# Patient Record
Sex: MALE | Race: WHITE | NOT HISPANIC OR LATINO | Employment: OTHER | URBAN - METROPOLITAN AREA
[De-identification: names, ages, dates, MRNs, and addresses within clinical notes are randomized per-mention and may not be internally consistent; named-entity substitution may affect disease eponyms.]

---

## 2023-05-30 ENCOUNTER — TRANSFERRED RECORDS (OUTPATIENT)
Dept: HEALTH INFORMATION MANAGEMENT | Facility: CLINIC | Age: 43
End: 2023-05-30

## 2023-07-20 ENCOUNTER — TRANSCRIBE ORDERS (OUTPATIENT)
Dept: OTHER | Age: 43
End: 2023-07-20

## 2023-07-28 ENCOUNTER — TELEPHONE (OUTPATIENT)
Dept: RADIOLOGY | Facility: CLINIC | Age: 43
End: 2023-07-28

## 2023-07-28 DIAGNOSIS — E26.9 HYPERALDOSTERONISM (H): Primary | ICD-10-CM

## 2023-07-28 NOTE — TELEPHONE ENCOUNTER
I called about an AVS referral from [facility] End[facility] Dr Beasley.  Ramiro has had CT done last November at the [facility], he states he is not on spironolactone.  His potassium level is slightly low but he is not on an oral potassium replacement.  [facility] number is [phone].  [facility] to get CT pushed, and lab report, see if there was a confirmatory testing completed.   I spoke with Ramiro and feliberto.   MARVA Louise, RN, BSN  Interventional Radiology Nurse Coordinator   Phone:  [phone]

## 2023-08-29 ENCOUNTER — ANCILLARY PROCEDURE (OUTPATIENT)
Dept: CT IMAGING | Facility: CLINIC | Age: 43
End: 2023-08-29
Attending: RADIOLOGY
Payer: COMMERCIAL

## 2023-08-29 ENCOUNTER — LAB (OUTPATIENT)
Dept: LAB | Facility: CLINIC | Age: 43
End: 2023-08-29
Attending: RADIOLOGY
Payer: COMMERCIAL

## 2023-08-29 DIAGNOSIS — E26.9 HYPERALDOSTERONISM (H): ICD-10-CM

## 2023-08-29 LAB
ANION GAP SERPL CALCULATED.3IONS-SCNC: 10 MMOL/L (ref 7–15)
BUN SERPL-MCNC: 13.3 MG/DL (ref 6–20)
CALCIUM SERPL-MCNC: 9.7 MG/DL (ref 8.6–10)
CHLORIDE SERPL-SCNC: 100 MMOL/L (ref 98–107)
CREAT SERPL-MCNC: 1.19 MG/DL (ref 0.67–1.17)
DEPRECATED HCO3 PLAS-SCNC: 29 MMOL/L (ref 22–29)
GFR SERPL CREATININE-BSD FRML MDRD: 78 ML/MIN/1.73M2
GLUCOSE SERPL-MCNC: 67 MG/DL (ref 70–99)
POTASSIUM SERPL-SCNC: 3.5 MMOL/L (ref 3.4–5.3)
SODIUM SERPL-SCNC: 139 MMOL/L (ref 136–145)

## 2023-08-29 PROCEDURE — 80048 BASIC METABOLIC PNL TOTAL CA: CPT | Performed by: PATHOLOGY

## 2023-08-29 PROCEDURE — 36415 COLL VENOUS BLD VENIPUNCTURE: CPT | Performed by: PATHOLOGY

## 2023-08-29 PROCEDURE — 74177 CT ABD & PELVIS W/CONTRAST: CPT | Performed by: RADIOLOGY

## 2023-08-29 RX ORDER — IOPAMIDOL 755 MG/ML
122 INJECTION, SOLUTION INTRAVASCULAR ONCE
Status: COMPLETED | OUTPATIENT
Start: 2023-08-29 | End: 2023-08-29

## 2023-08-29 RX ADMIN — IOPAMIDOL 122 ML: 755 INJECTION, SOLUTION INTRAVASCULAR at 18:11

## 2023-08-30 ENCOUNTER — ONCOLOGY VISIT (OUTPATIENT)
Dept: RADIOLOGY | Facility: CLINIC | Age: 43
End: 2023-08-30
Attending: RADIOLOGY
Payer: COMMERCIAL

## 2023-08-30 VITALS
WEIGHT: 293.2 LBS | HEART RATE: 90 BPM | DIASTOLIC BLOOD PRESSURE: 84 MMHG | SYSTOLIC BLOOD PRESSURE: 122 MMHG | OXYGEN SATURATION: 99 % | TEMPERATURE: 98.8 F

## 2023-08-30 DIAGNOSIS — E26.9 HYPERALDOSTERONISM (H): ICD-10-CM

## 2023-08-30 DIAGNOSIS — E87.6 HYPOKALEMIA: Primary | ICD-10-CM

## 2023-08-30 PROCEDURE — 99204 OFFICE O/P NEW MOD 45 MIN: CPT | Mod: GC | Performed by: RADIOLOGY

## 2023-08-30 RX ORDER — CYCLOBENZAPRINE HCL 10 MG
30 TABLET ORAL
COMMUNITY

## 2023-08-30 RX ORDER — PRAZOSIN HYDROCHLORIDE 1 MG/1
1 CAPSULE ORAL
COMMUNITY

## 2023-08-30 RX ORDER — OMEPRAZOLE 40 MG/1
40 CAPSULE, DELAYED RELEASE ORAL
COMMUNITY

## 2023-08-30 RX ORDER — DULOXETIN HYDROCHLORIDE 30 MG/1
30 CAPSULE, DELAYED RELEASE ORAL 2 TIMES DAILY
COMMUNITY

## 2023-08-30 RX ORDER — NAPROXEN 500 MG/1
500 TABLET ORAL
COMMUNITY

## 2023-08-30 RX ORDER — ASPIRIN 81 MG/1
81 TABLET ORAL
COMMUNITY

## 2023-08-30 RX ORDER — SILDENAFIL 50 MG/1
50 TABLET, FILM COATED ORAL
COMMUNITY

## 2023-08-30 RX ORDER — POTASSIUM CHLORIDE 3 G/15ML
SOLUTION ORAL
Qty: 15 ML | Refills: 0 | Status: SHIPPED | OUTPATIENT
Start: 2023-08-30

## 2023-08-30 RX ORDER — VALACYCLOVIR HYDROCHLORIDE 1 G/1
1000 TABLET, FILM COATED ORAL
COMMUNITY

## 2023-08-30 RX ORDER — LORATADINE 10 MG/1
10 TABLET ORAL
COMMUNITY

## 2023-08-30 RX ORDER — LISINOPRIL AND HYDROCHLOROTHIAZIDE 12.5; 2 MG/1; MG/1
2 TABLET ORAL DAILY
Status: ON HOLD | COMMUNITY
End: 2023-10-30

## 2023-08-30 RX ORDER — METOPROLOL TARTRATE 25 MG/1
25 TABLET, FILM COATED ORAL AT BEDTIME
COMMUNITY

## 2023-08-30 ASSESSMENT — PAIN SCALES - GENERAL: PAINLEVEL: NO PAIN (0)

## 2023-08-30 NOTE — NURSING NOTE
Oncology Rooming Note    August 30, 2023 9:18 AM   Ramiro Gonzalez is a 42 year old male who presents for:    Chief Complaint   Patient presents with    Oncology Clinic Visit     Hyperaldosteronism     Initial Vitals: /84 (BP Location: Right arm, Patient Position: Sitting, Cuff Size: Adult Large)   Pulse 90   Temp 98.8  F (37.1  C) (Oral)   Wt 133 kg (293 lb 3.2 oz)   SpO2 99%  There is no height or weight on file to calculate BMI. There is no height or weight on file to calculate BSA.  No Pain (0) Comment: Data Unavailable   No LMP for male patient.  Allergies reviewed: Yes  Medications reviewed: Yes    Medications: Medication refills not needed today.  Pharmacy name entered into [facility]: [facility] - [place], [place] - [address]    Clinical concerns: none      Ciarra Wan, EMT  8/30/2023

## 2023-08-30 NOTE — LETTER
"    8/30/2023         RE: Ramiro Gonzalez  [address] [facility] [address]        Dear Colleague,    Thank you for referring your patient, Ramiro Gonzalez, to the [facility]. Please see a copy of my visit note below.        INTERVENTIONAL RADIOLOGY CONSULTATION    Name: Ramiro Gonzalez  Age: 42 year old   Referring Physician: Dr. Adam   REASON FOR REFERRAL: Hyper-aldosteronism and renal vein sampling    HPI:   Ramiro Gonzalez is a 42 year old patient with a history of hypertension and primary hyper-aldosteronism who presents for evaluation for adrenal vein sampling for further evaluation of his hyper-aldosteronism. He reports that he has been dealing with hypertension since he was young, but it became more of a problem about 7 years ago. When his blood pressure is high, he experiences fatigue, palpitations, and \"head pressure\". He has presented to the ED for these symptoms before. He has no history of hospitalization, hypertensive emergency/crisis, or MI. He denies any current headache, palpitation, arrhythmias, abdominal pain, vomiting. He denies any history of stroke or brain bleed.  Endorses some nausea. Endorses some chronic knee pain and no lower extremity swelling. Reports taking an OTC potassium supplement.     PAST MEDICAL HISTORY:   Hypertension  Hyper-aldosteronism  PTSD    PAST SURGICAL HISTORY:   History of hernia repair     FAMILY HISTORY:   No family history on file.    SOCIAL HISTORY:   Social History     Tobacco Use    Smoking status: Not on file    Smokeless tobacco: Not on file   Substance Use Topics    Alcohol use: Not on file       PROBLEM LIST:   There are no problems to display for this patient.      MEDICATIONS:   Prescription Medications as of 8/30/2023         Rx Number Disp Refills Start End Last Dispensed Date Next Fill Date Owning Pharmacy    DULoxetine (CYMBALTA) 30 MG capsule            Sig: Take 30 mg by mouth 2 times daily    Class: Historical    Route: Oral    " lisinopril-hydrochlorothiazide (ZESTORETIC) 20-12.5 MG tablet            Sig: Take 2 tablets by mouth daily    Class: Historical    Route: Oral    Potassium Chloride 40 MEQ/15ML (20%) SOLN  15 mL 0 8/30/2023    [facility] - [place], [place] - [address]    Sig: Please take 40 mEq in 15 ml oral liquid potassium once, take 90 minutes prior to Adrenal Vein sampling procedure.    Class: E-Prescribe    Renewals       Renewal requests to authorizing provider (Tootie Mcdowell MD) <b>prohibited</b>            Semaglutide-Weight Management (WEGOVY) 1 MG/0.5ML pen    7/25/2023        Sig: Inject 1 mg Subcutaneous    Class: Historical    Route: Subcutaneous    Semaglutide-Weight Management (WEGOVY) 1.7 MG/0.75ML pen    7/25/2023        Sig: Inject 1.7 mg Subcutaneous    Class: Historical    Route: Subcutaneous    Semaglutide-Weight Management (WEGOVY) 2.4 MG/0.75ML pen    7/25/2023        Sig: Inject 2.4 mg Subcutaneous    Class: Historical    Route: Subcutaneous    albuterol (PROAIR RESPICLICK) 108 (90 Base) MCG/ACT inhaler            Sig: Inhale 2 puffs into the lungs    Class: Historical    Route: Inhalation    aspirin 81 MG EC tablet            Sig: Take 81 mg by mouth    Class: Historical    Route: Oral    cyclobenzaprine (FLEXERIL) 10 MG tablet            Sig: Take 30 mg by mouth    Class: Historical    Route: Oral    loratadine (CLARITIN) 10 MG tablet            Sig: Take 10 mg by mouth    Class: Historical    Route: Oral    metoprolol tartrate (LOPRESSOR) 25 MG tablet            Sig: Take 25 mg by mouth At Bedtime    Class: Historical    Route: Oral    naproxen (EC-NAPROSYN) 500 MG EC tablet            Sig: Take 500 mg by mouth    Class: Historical    Route: Oral    omeprazole (PRILOSEC) 40 MG DR capsule            Sig: Take 40 mg by mouth    Class: Historical    Route: Oral    prazosin (MINIPRESS) 1 MG capsule            Sig: Take 1 mg by mouth    Class: Historical    Route: Oral    sildenafil (VIAGRA)  50 MG tablet            Sig: Take 50 mg by mouth    Class: Historical    Route: Oral    valACYclovir (VALTREX) 1000 mg tablet            Sig: Take 1,000 mg by mouth    Class: Historical    Route: Oral          Clinic-Administered Medications as of 8/30/2023         Dose Frequency Start End    ct flush (Completed) 100 mL ONCE 8/29/2023 8/29/2023    Admin Instructions: This entry is for use by Radiology to intermittently used as a flush in patients receiving a CT scan.    Route: Intravenous    iopamidol (ISOVUE-370) solution 122 mL (Completed) 122 mL ONCE 8/29/2023 8/29/2023    Route: Intravenous            ALLERGIES:   Sulfa antibiotics and Cranberry juice powder    ROS:  Negative unless otherwise stated in HPI.      Physical Examination:   VITALS:   /84 (BP Location: Right arm, Patient Position: Sitting, Cuff Size: Adult Large)   Pulse 90   Temp 98.8  F (37.1  C) (Oral)   Wt 133 kg (293 lb 3.2 oz)   SpO2 99%     General: normal and well-appearing, alert and oriented  Mouth: normal oral cavity and no lesions  Cardiac: RRR with no murmurs  Lungs: normal respiratory rate, CTAB  MSK: no lower extremity swelling    Labs:    BMP RESULTS:  Lab Results   Component Value Date     08/29/2023    POTASSIUM 3.5 08/29/2023    CHLORIDE 100 08/29/2023    CO2 29 08/29/2023    ANIONGAP 10 08/29/2023    GLC 67 (L) 08/29/2023    BUN 13.3 08/29/2023    CR 1.19 (H) 08/29/2023    GFRESTIMATED 78 08/29/2023    ARJUN 9.7 08/29/2023        CBC RESULTS:  No results found for: WBC, RBC, HGB, HCT, MCV, MCH, MCHC, RDW, PLT    INR/PTT:  No results found for: INR, PTT    Diagnostic studies:   Imaging reviewed by me  CT Abdomen pelvis w contrast (8/29/23)  1.  No discrete nodules identified in the adrenal glands. There is  some thickening towards the apex of the left adrenal gland which  likely represents normal left glandular anatomy. Normal anatomy of the  left adrenal vein draining into the left renal vein. Right adrenal  vein  difficult to clearly identify separate from the gland and  proximity to IVC.  2.  Incidental hemangioma in the left hepatic lobe stable from prior  study.  3.  Cholelithiasis with no evidence of acute cholecystitis.      Assessment   Ramirotigist Gonzalez is a 42 year old patient with a history of hypertension and primary hyperaldosteronism who presents for evaluation for adrenal vein sampling for further evaluation of his aldosteronism. There are no complications with his blood pressure medications.  He is not currently on a mineralocorticoid receptor antagonist.  Most recent potassium without supplement was 3.5.  We reviewed the details of the adrenal vein sampling procedure and discussed the importance of having his potassium greater than 3.5 on the day of the procedure. Potassium taken yesterday was 3.5 so we will encourage him to resume potassium supplementation and we will provide a prescription for fast absorbing rapidly absorbing potassium to take immediately prior to presenting for his procedure.I discussed the procedural details including risk of nondiagnostic sampling, access site bleeding, adrenal vein injury, adrenal vein thrombosis, and adrenal hematoma.  He would like to proceed.    Plan   - Schedule adrenal vein sampling   - Continue current potassium supplement and take 40 mEq of Kcl 40 minutes prior to check-in for his procedure  - Avoid spironolactone or other mineralocorticoid receptor antagonists for least 1 month prior to the procedure.     I was present for the entire clinic visit and agree with the assessment and plan documented by the medical student.    It was a pleasure to conduct this in-person clinic visit with Mr. Gonzalez today.  Thank you for involving the interventional radiology service in his care.    I spent a total of 30 minutes face-to-face time on today's clinic visit, over 50% time was for counseling and care coordination.  In addition I spent 10 minutes completing documentation and 5  minutes reviewing imaging.    Tootie Mcdowell MD  Interventional Radiology   Pager [phone]      CC  Patient Care Team:  Patrick Parker MD as PCP - General  SELF, REFERRED      ----- Services Performed by a MEDICAL STUDENT in Presence of ATTENDING Physician-------      NADIRA Kim    There are no diagnoses linked to this encounter.     Review of the result(s) of each unique test - CT, BMP  Independent interpretation of a test performed by another physician/other qualified health care professional (not separately reported) - CT

## 2023-08-30 NOTE — PROGRESS NOTES
"    INTERVENTIONAL RADIOLOGY CONSULTATION    Name: Ramiro Gonzalez  Age: 42 year old   Referring Physician: Dr. Adam   REASON FOR REFERRAL: Hyper-aldosteronism and renal vein sampling    HPI:   Ramiro Gonzalez is a 42 year old patient with a history of hypertension and primary hyper-aldosteronism who presents for evaluation for adrenal vein sampling for further evaluation of his hyper-aldosteronism. He reports that he has been dealing with hypertension since he was young, but it became more of a problem about 7 years ago. When his blood pressure is high, he experiences fatigue, palpitations, and \"head pressure\". He has presented to the ED for these symptoms before. He has no history of hospitalization, hypertensive emergency/crisis, or MI. He denies any current headache, palpitation, arrhythmias, abdominal pain, vomiting. He denies any history of stroke or brain bleed.  Endorses some nausea. Endorses some chronic knee pain and no lower extremity swelling. Reports taking an OTC potassium supplement.     PAST MEDICAL HISTORY:   Hypertension  Hyper-aldosteronism  PTSD    PAST SURGICAL HISTORY:   History of hernia repair     FAMILY HISTORY:   No family history on file.    SOCIAL HISTORY:   Social History     Tobacco Use     Smoking status: Not on file     Smokeless tobacco: Not on file   Substance Use Topics     Alcohol use: Not on file       PROBLEM LIST:   There are no problems to display for this patient.      MEDICATIONS:   Prescription Medications as of 8/30/2023         Rx Number Disp Refills Start End Last Dispensed Date Next Fill Date Owning Pharmacy    DULoxetine (CYMBALTA) 30 MG capsule            Sig: Take 30 mg by mouth 2 times daily    Class: Historical    Route: Oral    lisinopril-hydrochlorothiazide (ZESTORETIC) 20-12.5 MG tablet            Sig: Take 2 tablets by mouth daily    Class: Historical    Route: Oral    Potassium Chloride 40 MEQ/15ML (20%) SOLN  15 mL 0 8/30/2023    [facility] - [place], [place] - " [address]    Sig: Please take 40 mEq in 15 ml oral liquid potassium once, take 90 minutes prior to Adrenal Vein sampling procedure.    Class: E-Prescribe    Renewals       Renewal requests to authorizing provider (Tootie Mcdowell MD) <b>prohibited</b>            Semaglutide-Weight Management (WEGOVY) 1 MG/0.5ML pen    7/25/2023        Sig: Inject 1 mg Subcutaneous    Class: Historical    Route: Subcutaneous    Semaglutide-Weight Management (WEGOVY) 1.7 MG/0.75ML pen    7/25/2023        Sig: Inject 1.7 mg Subcutaneous    Class: Historical    Route: Subcutaneous    Semaglutide-Weight Management (WEGOVY) 2.4 MG/0.75ML pen    7/25/2023        Sig: Inject 2.4 mg Subcutaneous    Class: Historical    Route: Subcutaneous    albuterol (PROAIR RESPICLICK) 108 (90 Base) MCG/ACT inhaler            Sig: Inhale 2 puffs into the lungs    Class: Historical    Route: Inhalation    aspirin 81 MG EC tablet            Sig: Take 81 mg by mouth    Class: Historical    Route: Oral    cyclobenzaprine (FLEXERIL) 10 MG tablet            Sig: Take 30 mg by mouth    Class: Historical    Route: Oral    loratadine (CLARITIN) 10 MG tablet            Sig: Take 10 mg by mouth    Class: Historical    Route: Oral    metoprolol tartrate (LOPRESSOR) 25 MG tablet            Sig: Take 25 mg by mouth At Bedtime    Class: Historical    Route: Oral    naproxen (EC-NAPROSYN) 500 MG EC tablet            Sig: Take 500 mg by mouth    Class: Historical    Route: Oral    omeprazole (PRILOSEC) 40 MG DR capsule            Sig: Take 40 mg by mouth    Class: Historical    Route: Oral    prazosin (MINIPRESS) 1 MG capsule            Sig: Take 1 mg by mouth    Class: Historical    Route: Oral    sildenafil (VIAGRA) 50 MG tablet            Sig: Take 50 mg by mouth    Class: Historical    Route: Oral    valACYclovir (VALTREX) 1000 mg tablet            Sig: Take 1,000 mg by mouth    Class: Historical    Route: Oral          Clinic-Administered Medications as  of 8/30/2023         Dose Frequency Start End    ct flush (Completed) 100 mL ONCE 8/29/2023 8/29/2023    Admin Instructions: This entry is for use by Radiology to intermittently used as a flush in patients receiving a CT scan.    Route: Intravenous    iopamidol (ISOVUE-370) solution 122 mL (Completed) 122 mL ONCE 8/29/2023 8/29/2023    Route: Intravenous            ALLERGIES:   Sulfa antibiotics and Cranberry juice powder    ROS:  Negative unless otherwise stated in HPI.      Physical Examination:   VITALS:   /84 (BP Location: Right arm, Patient Position: Sitting, Cuff Size: Adult Large)   Pulse 90   Temp 98.8  F (37.1  C) (Oral)   Wt 133 kg (293 lb 3.2 oz)   SpO2 99%     General: normal and well-appearing, alert and oriented  Mouth: normal oral cavity and no lesions  Cardiac: RRR with no murmurs  Lungs: normal respiratory rate, CTAB  MSK: no lower extremity swelling    Labs:    BMP RESULTS:  Lab Results   Component Value Date     08/29/2023    POTASSIUM 3.5 08/29/2023    CHLORIDE 100 08/29/2023    CO2 29 08/29/2023    ANIONGAP 10 08/29/2023    GLC 67 (L) 08/29/2023    BUN 13.3 08/29/2023    CR 1.19 (H) 08/29/2023    GFRESTIMATED 78 08/29/2023    ARJUN 9.7 08/29/2023        CBC RESULTS:  No results found for: WBC, RBC, HGB, HCT, MCV, MCH, MCHC, RDW, PLT    INR/PTT:  No results found for: INR, PTT    Diagnostic studies:   Imaging reviewed by me  CT Abdomen pelvis w contrast (8/29/23)  1.  No discrete nodules identified in the adrenal glands. There is  some thickening towards the apex of the left adrenal gland which  likely represents normal left glandular anatomy. Normal anatomy of the  left adrenal vein draining into the left renal vein. Right adrenal  vein difficult to clearly identify separate from the gland and  proximity to IVC.  2.  Incidental hemangioma in the left hepatic lobe stable from prior  study.  3.  Cholelithiasis with no evidence of acute cholecystitis.      Assessment   Ramirotigist Gonzalez is  a 42 year old patient with a history of hypertension and primary hyperaldosteronism who presents for evaluation for adrenal vein sampling for further evaluation of his aldosteronism. There are no complications with his blood pressure medications.  He is not currently on a mineralocorticoid receptor antagonist.  Most recent potassium without supplement was 3.5.  We reviewed the details of the adrenal vein sampling procedure and discussed the importance of having his potassium greater than 3.5 on the day of the procedure. Potassium taken yesterday was 3.5 so we will encourage him to resume potassium supplementation and we will provide a prescription for fast absorbing rapidly absorbing potassium to take immediately prior to presenting for his procedure.I discussed the procedural details including risk of nondiagnostic sampling, access site bleeding, adrenal vein injury, adrenal vein thrombosis, and adrenal hematoma.  He would like to proceed.    Plan   - Schedule adrenal vein sampling   - Continue current potassium supplement and take 40 mEq of Kcl 40 minutes prior to check-in for his procedure  - Avoid spironolactone or other mineralocorticoid receptor antagonists for least 1 month prior to the procedure.     I was present for the entire clinic visit and agree with the assessment and plan documented by the medical student.    It was a pleasure to conduct this in-person clinic visit with Mr. Carlos alcantar.  Thank you for involving the interventional radiology service in his care.    I spent a total of 30 minutes face-to-face time on today's clinic visit, over 50% time was for counseling and care coordination.  In addition I spent 10 minutes completing documentation and 5 minutes reviewing imaging.    Tootie Mcdowell MD  Interventional Radiology   Pager [phone]      CC  Patient Care Team:  Patrick Parker MD as PCP - General  SELF, REFERRED      ----- Services Performed by a MEDICAL STUDENT in Presence of  ATTENDING Physician-------      Julius Garland, MS4    There are no diagnoses linked to this encounter.     Review of the result(s) of each unique test - CT, BMP  Independent interpretation of a test performed by another physician/other qualified health care professional (not separately reported) - CT

## 2023-08-30 NOTE — PATIENT INSTRUCTIONS
Ramiro you have had your consult today with Dr Mcdowell for Adrenal Vein sampling.     Plan:    We will call you to schedule your AVS procedure.       A prescription was sent electronically to the [facility] for Oral liquid potassium to be taken on procedure date.     Please call with questions    MARVA Louise RN, BSN  Interventional Radiology Nurse Coordinator   Phone:  [phone]

## 2023-08-30 NOTE — LETTER
August 30, 2023    Ramiro Gonzalez  [address] [place] [id number]      To whom it may concern,     Ramiro has attended a consult for Adrenal vein sampling procedure with Dr Mcdowell Interventional radiology and CT abdomen pelvis on 8/30/23 at the [facility], [address] [place] [address].    Thank you,     MARVA Louise, RN, BSN  Interventional Radiology Nurse Coordinator   Phone:  [phone]  .

## 2023-10-22 ENCOUNTER — HEALTH MAINTENANCE LETTER (OUTPATIENT)
Age: 43
End: 2023-10-22

## 2023-10-30 ENCOUNTER — APPOINTMENT (OUTPATIENT)
Dept: MEDSURG UNIT | Facility: CLINIC | Age: 43
End: 2023-10-30
Attending: RADIOLOGY
Payer: COMMERCIAL

## 2023-10-30 ENCOUNTER — HOSPITAL ENCOUNTER (OUTPATIENT)
Facility: CLINIC | Age: 43
Discharge: HOME | End: 2023-10-30
Attending: RADIOLOGY | Admitting: RADIOLOGY
Payer: COMMERCIAL

## 2023-10-30 ENCOUNTER — APPOINTMENT (OUTPATIENT)
Dept: INTERVENTIONAL RADIOLOGY/VASCULAR | Facility: CLINIC | Age: 43
End: 2023-10-30
Attending: RADIOLOGY
Payer: COMMERCIAL

## 2023-10-30 VITALS
RESPIRATION RATE: 18 BRPM | HEART RATE: 74 BPM | WEIGHT: 276.02 LBS | BODY MASS INDEX: 41.83 KG/M2 | SYSTOLIC BLOOD PRESSURE: 115 MMHG | DIASTOLIC BLOOD PRESSURE: 67 MMHG | HEIGHT: 68 IN | OXYGEN SATURATION: 95 % | TEMPERATURE: 98.3 F

## 2023-10-30 DIAGNOSIS — E87.6 HYPOKALEMIA: ICD-10-CM

## 2023-10-30 DIAGNOSIS — E26.9 HYPERALDOSTERONISM (H): ICD-10-CM

## 2023-10-30 LAB
ANION GAP SERPL CALCULATED.3IONS-SCNC: 13 MMOL/L (ref 7–15)
BUN SERPL-MCNC: 11 MG/DL (ref 6–20)
CALCIUM SERPL-MCNC: 9.3 MG/DL (ref 8.6–10)
CHLORIDE SERPL-SCNC: 105 MMOL/L (ref 98–107)
CORTIS SERPL-MCNC: 25.9 UG/DL
CORTIS SERPL-MCNC: 26.8 UG/DL
CORTIS SERPL-MCNC: 356 UG/DL
CORTIS SERPL-MCNC: 967 UG/DL
CREAT SERPL-MCNC: 1.11 MG/DL (ref 0.67–1.17)
DEPRECATED HCO3 PLAS-SCNC: 23 MMOL/L (ref 22–29)
EGFRCR SERPLBLD CKD-EPI 2021: 84 ML/MIN/1.73M2
ERYTHROCYTE [DISTWIDTH] IN BLOOD BY AUTOMATED COUNT: 13.2 % (ref 10–15)
GLUCOSE SERPL-MCNC: 93 MG/DL (ref 70–99)
HCT VFR BLD AUTO: 45.1 % (ref 40–53)
HGB BLD-MCNC: 15.2 G/DL (ref 13.3–17.7)
INR PPP: 0.97 (ref 0.85–1.15)
MCH RBC QN AUTO: 27.7 PG (ref 26.5–33)
MCHC RBC AUTO-ENTMCNC: 33.7 G/DL (ref 31.5–36.5)
MCV RBC AUTO: 82 FL (ref 78–100)
PLATELET # BLD AUTO: 274 10E3/UL (ref 150–450)
POTASSIUM SERPL-SCNC: 3.7 MMOL/L (ref 3.4–5.3)
RBC # BLD AUTO: 5.48 10E6/UL (ref 4.4–5.9)
SODIUM SERPL-SCNC: 141 MMOL/L (ref 135–145)
SPECIMEN SOURCE, AVS: NORMAL
WBC # BLD AUTO: 5.8 10E3/UL (ref 4–11)

## 2023-10-30 PROCEDURE — 258N000003 HC RX IP 258 OP 636: Performed by: NURSE PRACTITIONER

## 2023-10-30 PROCEDURE — C1887 CATHETER, GUIDING: HCPCS

## 2023-10-30 PROCEDURE — 99152 MOD SED SAME PHYS/QHP 5/>YRS: CPT | Mod: GC | Performed by: RADIOLOGY

## 2023-10-30 PROCEDURE — 82533 TOTAL CORTISOL: CPT | Performed by: RADIOLOGY

## 2023-10-30 PROCEDURE — 99152 MOD SED SAME PHYS/QHP 5/>YRS: CPT

## 2023-10-30 PROCEDURE — 36500 INSERTION OF CATHETER VEIN: CPT | Mod: XS | Performed by: RADIOLOGY

## 2023-10-30 PROCEDURE — 82088 ASSAY OF ALDOSTERONE: CPT | Performed by: RADIOLOGY

## 2023-10-30 PROCEDURE — 250N000009 HC RX 250: Performed by: RADIOLOGY

## 2023-10-30 PROCEDURE — 85027 COMPLETE CBC AUTOMATED: CPT | Performed by: NURSE PRACTITIONER

## 2023-10-30 PROCEDURE — 36415 COLL VENOUS BLD VENIPUNCTURE: CPT | Performed by: NURSE PRACTITIONER

## 2023-10-30 PROCEDURE — 36500 INSERTION OF CATHETER VEIN: CPT | Mod: RT

## 2023-10-30 PROCEDURE — C1769 GUIDE WIRE: HCPCS

## 2023-10-30 PROCEDURE — 250N000011 HC RX IP 250 OP 636: Performed by: RADIOLOGY

## 2023-10-30 PROCEDURE — 272N000504 HC NEEDLE CR4

## 2023-10-30 PROCEDURE — 255N000002 HC RX 255 OP 636: Performed by: RADIOLOGY

## 2023-10-30 PROCEDURE — 75893 VENOUS SAMPLING BY CATHETER: CPT

## 2023-10-30 PROCEDURE — 272N000566 HC SHEATH CR3

## 2023-10-30 PROCEDURE — 36500 INSERTION OF CATHETER VEIN: CPT | Mod: GC | Performed by: RADIOLOGY

## 2023-10-30 PROCEDURE — 272N000143 HC KIT CR3

## 2023-10-30 PROCEDURE — 999N000133 HC STATISTIC PP CARE STAGE 2

## 2023-10-30 PROCEDURE — 76937 US GUIDE VASCULAR ACCESS: CPT

## 2023-10-30 PROCEDURE — 85610 PROTHROMBIN TIME: CPT | Performed by: NURSE PRACTITIONER

## 2023-10-30 PROCEDURE — 999N000142 HC STATISTIC PROCEDURE PREP ONLY

## 2023-10-30 PROCEDURE — 75893 VENOUS SAMPLING BY CATHETER: CPT | Mod: 26 | Performed by: RADIOLOGY

## 2023-10-30 PROCEDURE — 76937 US GUIDE VASCULAR ACCESS: CPT | Mod: 26 | Performed by: RADIOLOGY

## 2023-10-30 PROCEDURE — 36415 COLL VENOUS BLD VENIPUNCTURE: CPT | Performed by: RADIOLOGY

## 2023-10-30 PROCEDURE — 80048 BASIC METABOLIC PNL TOTAL CA: CPT | Performed by: NURSE PRACTITIONER

## 2023-10-30 PROCEDURE — 250N000011 HC RX IP 250 OP 636: Mod: JZ | Performed by: NURSE PRACTITIONER

## 2023-10-30 RX ORDER — NALOXONE HYDROCHLORIDE 0.4 MG/ML
0.4 INJECTION, SOLUTION INTRAMUSCULAR; INTRAVENOUS; SUBCUTANEOUS
Status: DISCONTINUED | OUTPATIENT
Start: 2023-10-30 | End: 2023-10-30 | Stop reason: HOSPADM

## 2023-10-30 RX ORDER — AMLODIPINE BESYLATE 10 MG/1
10 TABLET ORAL DAILY
COMMUNITY

## 2023-10-30 RX ORDER — IODIXANOL 320 MG/ML
100 INJECTION, SOLUTION INTRAVASCULAR ONCE
Status: COMPLETED | OUTPATIENT
Start: 2023-10-30 | End: 2023-10-30

## 2023-10-30 RX ORDER — NALOXONE HYDROCHLORIDE 0.4 MG/ML
0.2 INJECTION, SOLUTION INTRAMUSCULAR; INTRAVENOUS; SUBCUTANEOUS
Status: DISCONTINUED | OUTPATIENT
Start: 2023-10-30 | End: 2023-10-30 | Stop reason: HOSPADM

## 2023-10-30 RX ORDER — POTASSIUM CHLORIDE 1.5 G/1.58G
40 POWDER, FOR SOLUTION ORAL
Status: DISCONTINUED | OUTPATIENT
Start: 2023-10-30 | End: 2023-10-30 | Stop reason: HOSPADM

## 2023-10-30 RX ORDER — FLUMAZENIL 0.1 MG/ML
0.2 INJECTION, SOLUTION INTRAVENOUS
Status: DISCONTINUED | OUTPATIENT
Start: 2023-10-30 | End: 2023-10-30 | Stop reason: HOSPADM

## 2023-10-30 RX ORDER — SODIUM CHLORIDE 9 MG/ML
INJECTION, SOLUTION INTRAVENOUS CONTINUOUS
Status: DISCONTINUED | OUTPATIENT
Start: 2023-10-30 | End: 2023-10-30 | Stop reason: HOSPADM

## 2023-10-30 RX ORDER — DEXTROSE MONOHYDRATE 25 G/50ML
25-50 INJECTION, SOLUTION INTRAVENOUS
Status: DISCONTINUED | OUTPATIENT
Start: 2023-10-30 | End: 2023-10-30 | Stop reason: HOSPADM

## 2023-10-30 RX ORDER — LOSARTAN POTASSIUM 100 MG/1
100 TABLET ORAL DAILY
COMMUNITY

## 2023-10-30 RX ORDER — NICOTINE POLACRILEX 4 MG
15-30 LOZENGE BUCCAL
Status: DISCONTINUED | OUTPATIENT
Start: 2023-10-30 | End: 2023-10-30 | Stop reason: HOSPADM

## 2023-10-30 RX ORDER — LIDOCAINE 40 MG/G
CREAM TOPICAL
Status: DISCONTINUED | OUTPATIENT
Start: 2023-10-30 | End: 2023-10-30 | Stop reason: HOSPADM

## 2023-10-30 RX ORDER — FENTANYL CITRATE 50 UG/ML
25-50 INJECTION, SOLUTION INTRAMUSCULAR; INTRAVENOUS EVERY 5 MIN PRN
Status: DISCONTINUED | OUTPATIENT
Start: 2023-10-30 | End: 2023-10-30 | Stop reason: HOSPADM

## 2023-10-30 RX ADMIN — MIDAZOLAM 1 MG: 1 INJECTION INTRAMUSCULAR; INTRAVENOUS at 08:27

## 2023-10-30 RX ADMIN — COSYNTROPIN 50 MCG/HR: 0.25 INJECTION, POWDER, LYOPHILIZED, FOR SOLUTION INTRAMUSCULAR; INTRAVENOUS at 08:03

## 2023-10-30 RX ADMIN — IODIXANOL 40 ML: 320 INJECTION, SOLUTION INTRAVASCULAR at 09:38

## 2023-10-30 RX ADMIN — FENTANYL CITRATE 50 MCG: 50 INJECTION, SOLUTION INTRAMUSCULAR; INTRAVENOUS at 08:43

## 2023-10-30 RX ADMIN — MIDAZOLAM 1 MG: 1 INJECTION INTRAMUSCULAR; INTRAVENOUS at 08:42

## 2023-10-30 RX ADMIN — SODIUM CHLORIDE: 9 INJECTION, SOLUTION INTRAVENOUS at 08:06

## 2023-10-30 RX ADMIN — LIDOCAINE HYDROCHLORIDE 6 ML: 10 INJECTION, SOLUTION EPIDURAL; INFILTRATION; INTRACAUDAL; PERINEURAL at 08:43

## 2023-10-30 RX ADMIN — FENTANYL CITRATE 50 MCG: 50 INJECTION, SOLUTION INTRAMUSCULAR; INTRAVENOUS at 08:28

## 2023-10-30 ASSESSMENT — ACTIVITIES OF DAILY LIVING (ADL)
ADLS_ACUITY_SCORE: 35

## 2023-10-30 NOTE — DISCHARGE INSTRUCTIONS
[facility]   Interventional Radiology  Discharge Instructions Post Peripheral Adrenal Venogram    AFTER YOU GO HOME          Do relax and take it easy for 24 hours.       Do drink plenty of fluids.       Do resume your regular diet, unless otherwise instructed by your Primary Physician.       DO NOT smoke for at least 24 hours, if you were given any sedation.       DO NOT drink alcoholic beverages the day of your procedure.       Do not drive or operate machinery at home or at work for 24 hours.          DO NOT do any strenuous exercise or lifting for at least 2 days following your Procedure.       DO NOT take a bath or shower for at least 12 hours following your procedure.       DO NOT make any important or legal decisions for 24 hours following your procedure.    CALL THE PHYSICIAN IF:      - You start bleeding from the procedure site.  If you do start to bleed from the site, lie down flat and hold pressure on the site. A small lump or bruise is common at the puncture site.Your physician will tell you if you need to return to the hospital.      - You develop numbness, coolness or a change in color of the arm or leg that was punctured.      - You experience increased pain or redness at the puncture site.      - You develop hives or a rash or unexplained itching.      - You develop a temperature of 101 degrees F or greater  Additional Information:           Support the puncture site for coughing, sneezing, or moving your bowels for the first 48 hours  No tub bath, hot tubs, or swimming for 5 days  No lotion or powder to the puncture site for 3 days       [facility] INTERVENTIONAL RADIOLOGY DEPARTMENT         Procedure Physician:Dr Dumont/Dr Mcdowell   Date of Procedure: October 30, 2023       Telephone Numbers:   [phone]......Monday-Friday 7:30am to 4:00pm                                        [phone].....After 4:00 pm Monday-Friday, Weekends and  Holidays. Ask for the Interventional  Radiologist on call. Someone is on call 24 hrs/day.

## 2023-10-30 NOTE — PROGRESS NOTES
Pt prepped for a adrenal gland sampling.PIV placed x2 and NS infusing and cosyntropin in the other PIV.Francesco groins shaved and prepped and pulses marked and charted.IV contrast discharge instructions reviewed and signed and a copy given to pt.Consent signed and questions answered.

## 2023-10-30 NOTE — PROGRESS NOTES
Pt returned from IR via liter accompanied by nurse at 0945.Right groin site C/D/I no hematoma and denies pain.Pt tolerating food and fluids.

## 2023-10-30 NOTE — PROGRESS NOTES
Patient Name: Ramiro Gonzalez  Medical Record Number: [id number]  Today's Date: 10/30/2023    Procedure: Adrenal vein sampling, right groin procedural access site.  Proceduralist: Dr. JAZMIN Mcdowell, Dr. ANIBAL Guaman    Patient in room: 0817  Procedure Start: 0841  Procedure end: 0934  Sedation medications administered: 2 mg versed, 100 mcg fentanyl.  Patient out of room:0942    Report given to: 2.A. RZANDER    Other Notes: Pt arrived to IR room 1 from 2.A. Consent reviewed. Pt denies any questions or concerns regarding procedure. Pt positioned supine and monitored per protocol. Pt tolerated procedure without any noted complications. Pt transferred back to 2.A..

## 2023-10-30 NOTE — PRE-PROCEDURE
GENERAL PRE-PROCEDURE:   Procedure:  Adrenal vein sampling  Date/Time:  10/30/2023 8:00 AM    Verbal consent obtained?: Yes    Written consent obtained?: Yes    Risks and benefits: Risks, benefits and alternatives were discussed    Consent given by:  Patient  Patient states understanding of procedure being performed: Yes    Patient's understanding of procedure matches consent: Yes    Procedure consent matches procedure scheduled: Yes    Expected level of sedation:  Moderate  Appropriately NPO:  Yes  Mallampati  :  Grade 3- soft palate visible, posterior pharyngeal wall not visible  Lungs:  Lungs clear with good breath sounds bilaterally  Heart:  Normal heart sounds and rate  History & Physical reviewed:  History and physical reviewed and no updates needed  Statement of review:  I have reviewed the lab findings, diagnostic data, medications, and the plan for sedation

## 2023-10-30 NOTE — PROCEDURES
[facility]    Procedure: IR Procedure Note    Date/Time: 10/30/2023 9:44 AM    Performed by: Tim Last MD  Authorized by: Tootie Mcdowell MD      UNIVERSAL PROTOCOL   Site Marked: NA  Prior Images Obtained and Reviewed:  Yes  Required items: Required blood products, implants, devices and special equipment available    Patient identity confirmed:  Verbally with patient, arm band, provided demographic data and hospital-assigned identification number  Patient was reevaluated immediately before administering moderate or deep sedation or anesthesia  Confirmation Checklist:  Patient's identity using two indicators, relevant allergies, procedure was appropriate and matched the consent or emergent situation and correct equipment/implants were available  Time out: Immediately prior to the procedure a time out was called    Universal Protocol: the Joint Commission Universal Protocol was followed    Preparation: Patient was prepped and draped in usual sterile fashion       ANESTHESIA    Anesthesia: Local infiltration  Local Anesthetic:  Lidocaine 1% without epinephrine      SEDATION  Patient Sedated: Yes    Sedation:  Fentanyl and midazolam  Vital signs: Vital signs monitored during sedation    See dictated procedure note for full details.  Findings: Bl adrenal vein sampling    Specimens: none    Complications: None    Condition: Stable    Plan: Bl adrenal vein sampling      PROCEDURE  Describe Procedure: Bl adrenal vein sampling  Patient Tolerance:  Patient tolerated the procedure well with no immediate complications  Length of time physician/provider present for 1:1 monitoring during sedation: 30

## 2023-10-30 NOTE — PROGRESS NOTES
Patient tolerated recovery stage well. VSS, right groin site clean/dry/intact, no hematoma, and denies pain. Patient tolerated PO food and fluids. Teaching was done and discharge instructions were given. Patient ambulated, voided, and PIV was removed. Patient discharged from the hospital to home with parents.

## 2023-10-31 LAB
ALDOST SERPL-MCNC: 28.9 NG/DL
ALDOST SERPL-MCNC: 3100 NG/DL
ALDOST SERPL-MCNC: 34.3 NG/DL
ALDOST SERPL-MCNC: 91.7 NG/DL
SPECIMEN SOURCE, AVS: NORMAL

## 2023-11-02 ENCOUNTER — TELEPHONE (OUTPATIENT)
Dept: INTERVENTIONAL RADIOLOGY/VASCULAR | Facility: CLINIC | Age: 43
End: 2023-11-02

## 2023-11-02 NOTE — TELEPHONE ENCOUNTER
I spoke with pt. He is doing fine since his procedure. He is back at work. Access site looks good. I have faxed his results and the progress note to the [facility] as requested. Pt has my contact information if he should need it in the future.   Fidelina Monreal MS,RN,CRN  Nurse Care Coordinator-Interventional Radiology  [phone]

## 2024-12-15 ENCOUNTER — HEALTH MAINTENANCE LETTER (OUTPATIENT)
Age: 44
End: 2024-12-15

## (undated) RX ORDER — SODIUM CHLORIDE 9 MG/ML
INJECTION, SOLUTION INTRAVENOUS
Status: DISPENSED
Start: 2023-10-30

## (undated) RX ORDER — LIDOCAINE HYDROCHLORIDE 10 MG/ML
INJECTION, SOLUTION EPIDURAL; INFILTRATION; INTRACAUDAL; PERINEURAL
Status: DISPENSED
Start: 2023-10-30

## (undated) RX ORDER — FENTANYL CITRATE 50 UG/ML
INJECTION, SOLUTION INTRAMUSCULAR; INTRAVENOUS
Status: DISPENSED
Start: 2023-10-30